# Patient Record
Sex: FEMALE
[De-identification: names, ages, dates, MRNs, and addresses within clinical notes are randomized per-mention and may not be internally consistent; named-entity substitution may affect disease eponyms.]

---

## 2019-01-01 ENCOUNTER — HOSPITAL ENCOUNTER (EMERGENCY)
Dept: HOSPITAL 14 - H.ER | Age: 34
Discharge: HOME | End: 2019-01-01
Payer: COMMERCIAL

## 2019-01-01 DIAGNOSIS — N12: Primary | ICD-10-CM

## 2019-01-01 LAB
BACTERIA #/AREA URNS HPF: (no result) /[HPF]
BASE EXCESS BLDV CALC-SCNC: 1.5 MMOL/L (ref 0–2)
BASOPHILS # BLD AUTO: 0.1 K/UL (ref 0–0.2)
BASOPHILS NFR BLD: 0.6 % (ref 0–2)
BILIRUB UR-MCNC: NEGATIVE MG/DL
BUN SERPL-MCNC: 17 MG/DL (ref 7–17)
CALCIUM SERPL-MCNC: 8.7 MG/DL (ref 8.4–10.2)
COLOR UR: YELLOW
EOSINOPHIL # BLD AUTO: 0.8 K/UL (ref 0–0.7)
EOSINOPHIL NFR BLD: 8 % (ref 0–4)
ERYTHROCYTE [DISTWIDTH] IN BLOOD BY AUTOMATED COUNT: 12.3 % (ref 11.5–14.5)
GFR NON-AFRICAN AMERICAN: > 60
GLUCOSE UR STRIP-MCNC: (no result) MG/DL
HGB BLD-MCNC: 13.9 G/DL (ref 12–16)
LEUKOCYTE ESTERASE UR-ACNC: (no result) LEU/UL
LYMPHOCYTES # BLD AUTO: 2.1 K/UL (ref 1–4.3)
LYMPHOCYTES NFR BLD AUTO: 20.7 % (ref 20–40)
MCH RBC QN AUTO: 31.3 PG (ref 27–31)
MCHC RBC AUTO-ENTMCNC: 33.4 G/DL (ref 33–37)
MCV RBC AUTO: 93.7 FL (ref 81–99)
MONOCYTES # BLD: 0.8 K/UL (ref 0–0.8)
MONOCYTES NFR BLD: 7.5 % (ref 0–10)
NEUTROPHILS # BLD: 6.6 K/UL (ref 1.8–7)
NEUTROPHILS NFR BLD AUTO: 63.2 % (ref 50–75)
NRBC BLD AUTO-RTO: 0.1 % (ref 0–0)
PCO2 BLDV: 43 MMHG (ref 40–60)
PH BLDV: 7.4 [PH] (ref 7.32–7.43)
PH UR STRIP: 7 [PH] (ref 5–8)
PLATELET # BLD: 264 K/UL (ref 130–400)
PMV BLD AUTO: 7.5 FL (ref 7.2–11.7)
PROT UR STRIP-MCNC: 100 MG/DL
RBC # BLD AUTO: 4.45 MIL/UL (ref 3.8–5.2)
RBC # UR STRIP: (no result) /UL
SP GR UR STRIP: 1.02 (ref 1–1.03)
SQUAMOUS EPITHIAL: 4 /HPF (ref 0–5)
URINE CLARITY: (no result)
UROBILINOGEN UR-MCNC: (no result) MG/DL (ref 0.2–1)
VENOUS BLOOD FIO2: 21 %
VENOUS BLOOD GAS PO2: 48 MM/HG (ref 30–55)
WBC # BLD AUTO: 10.4 K/UL (ref 4.8–10.8)

## 2019-01-01 PROCEDURE — 85025 COMPLETE CBC W/AUTO DIFF WBC: CPT

## 2019-01-01 PROCEDURE — 96374 THER/PROPH/DIAG INJ IV PUSH: CPT

## 2019-01-01 PROCEDURE — 74177 CT ABD & PELVIS W/CONTRAST: CPT

## 2019-01-01 PROCEDURE — 99284 EMERGENCY DEPT VISIT MOD MDM: CPT

## 2019-01-01 PROCEDURE — 81003 URINALYSIS AUTO W/O SCOPE: CPT

## 2019-01-01 PROCEDURE — 80048 BASIC METABOLIC PNL TOTAL CA: CPT

## 2019-01-01 PROCEDURE — 82803 BLOOD GASES ANY COMBINATION: CPT

## 2019-01-01 PROCEDURE — 81025 URINE PREGNANCY TEST: CPT

## 2019-01-01 NOTE — CT
Date of service: 



01/01/2019



PROCEDURE:  CT Abdomen and Pelvis .



HISTORY:

UTI, R flank pain



COMPARISON:

None.



TECHNIQUE:

Contiguous axial images of the abdomen and pelvis performed following 

intravenous injection of approximately 90 cc Omnipaque 300 contrast 

material.  Additional 2D sagittal and coronal reformats generated. 



Radiation dose:



Total exam DLP = 611.63 mGy-cm.



This CT exam was performed using one or more of the following dose 

reduction techniques: Automated exposure control, adjustment of the 

mA and/or kV according to patient size, and/or use of iterative 

reconstruction technique.



FINDINGS:



LOWER THORAX:

Heart size within range of normal.  No significant pericardial 

effusion.  There is a tiny hiatal hernia.  The 



LIVER:

Liver is enlarged measuring over 20 cm in CC dimension.  There 

appears to be some very mild fatty hepatic infiltration.  No obvious 

hepatic masses or collections seen on this noncontrast study.  Portal 

and splenic veins opacified 



GALLBLADDER AND BILE DUCTS:

Gallbladder is physiologically distended.  No evidence of 

intraluminal gallbladder calculi. 



PANCREAS:

Unremarkable. No mass. No ductal dilatation.



SPLEEN:

Unremarkable. No splenomegaly. 



ADRENALS:

No adrenal lesions 



KIDNEYS AND URETERS:

Kidneys demonstrate symmetric nephrograms.  No evidence of 

nephrolithiasis or.  There is slight dilatation of the right renal 

pelvis and collecting system possibly secondary to UTI given the 

patient's history of same. 



BLADDER:

Urinary bladder is incompletely which may in part account for slight 

thick-walled appearance however cystitis is also suspected given the 

patient's history of UTI 



REPRODUCTIVE:

Unremarkable. 



APPENDIX:

The appendix unremarkable.



BOWEL:

Evaluation of the bowel is limited due to the lack of oral contrast 

material. 



Stomach is distended with food debris liquid and air.  Visualized 

loops of small bowel exhibit normal contour and caliber.  No evidence 

of acute mechanical small bowel obstruction. 



No definitive mural wall thickening. 



PERITONEUM:

Unremarkable. No fluid collection. No free air.



LYMPH NODES:

Unremarkable. No enlarged lymph nodes. 



VASCULATURE:

Unremarkable. No aortic aneurysm. No aortic atherosclerotic 

calcification or mural plaque present.



BONES:

No fracture or destructive lesion. 



OTHER FINDINGS:

None. 



IMPRESSION:

There is a bladder wall thickening likely in part due to incomplete 

distention no cystitis with ascending right-sided UTI suspected as 

evidence by prominent right renal pelvis and right ureter. No 

evidence of nephrolithiasis



Mild hepatomegaly with fatty infiltration.

## 2019-01-01 NOTE — ED PDOC
HPI: Abdomen


Time Seen by Provider: 01/01/19 15:23


Chief Complaint (Nursing): Female Genitourinary


Chief Complaint (Provider): abdominal pain


History Per: Patient


History/Exam Limitations: no limitations


Onset/Duration Of Symptoms: Persistent (x1 week)


Current Symptoms Are (Timing): Still Present


Additional Complaint(s): 


33 year old female with pmHx of asthma, presents to ED with complaints of 

abdominal pain and right flank pain after recent completion of Bactrim use for 

UTI. Patient went to [facility] earlier today then advised to go to ED for a CT scan

to rule out kidney infection. No reports of fever, chills, nausea, or vomiting.





PCP: none provided





Past Medical History


Reviewed: Historical Data, Nursing Documentation, Vital Signs


Vital Signs: 





                                Last Vital Signs











Temp  98 F   01/01/19 15:15


 


Pulse  78   01/01/19 15:15


 


Resp  18   01/01/19 15:15


 


BP  132/85   01/01/19 15:15


 


Pulse Ox  99   01/01/19 15:15














- Medical History


PMH: Asthma





- Family History


Family History: States: Unknown Family Hx





- Home Medications


Home Medications: 


                                Ambulatory Orders











 Medication  Instructions  Recorded


 


Levofloxacin [Levaquin] 750 mg PO DAILY 10 Days #10 tablet 01/01/19














- Allergies


Allergies/Adverse Reactions: 


                                    Allergies











Allergy/AdvReac Type Severity Reaction Status Date / Time


 


FISH Allergy  SHORTNESS Verified 01/01/19 17:10





   OF BREATH  


 


tree nuts Allergy  SHORTNESS Uncoded 01/01/19 17:12





   OF BREATH  














Review of Systems


ROS Statement: Except As Marked, All Systems Reviewed And Found Negative


Constitutional: Negative for: Fever, Chills


Gastrointestinal: Positive for: Abdominal Pain.  Negative for: Nausea, Vomiting


Musculoskeletal: Positive for: Back Pain (right flank)





Physical Exam





- Reviewed


Nursing Documentation Reviewed: Yes


Vital Signs Reviewed: Yes





- Physical Exam


Appears: Positive for: No Acute Distress


Head Exam: Positive for: ATRAUMATIC, NORMAL INSPECTION, NORMOCEPHALIC


Skin: Positive for: Normal Color


Eye Exam: Positive for: Normal appearance


ENT: Positive for: Normal ENT Inspection


Neck: Positive for: Normal, Supple


Cardiovascular/Chest: Positive for: Regular Rate, Rhythm, Chest Non Tender


Respiratory: Positive for: Normal Breath Sounds.  Negative for: Respiratory 

Distress


Gastrointestinal/Abdominal: Positive for: Normal Exam, Soft.  Negative for: 

Tenderness


Back: Positive for: R CVA Tenderness.  Negative for: L CVA Tenderness


Extremity: Positive for: Normal ROM (upper/lower)


Neurologic/Psych: Positive for: Alert, Oriented.  Negative for: Motor/Sensory 

Deficits





- Laboratory Results


Result Diagrams: 


                                 01/01/19 15:43





                                 01/01/19 15:43





- ECG


O2 Sat by Pulse Oximetry: 99 (RA)


Pulse Ox Interpretation: Normal





Medical Decision Making


Medical Decision Making: 


Time: 1530


Initial Plan: well-appearing female with flank pain. Possible UTI vs. 

pyelonephritis vs. kidney stones vs. muscular spasm.


* Labs


* CT ABD/pelvis


* Toradol 30mg IVP





630PM


--Patient has signs of pyelonephritis on CT


--Will give IV Dose of levoquin


--Patient tolerating PO, normal viatls, well appearing, negative lactate, 

suitable for outpatient followup


--Return precautions discussed


 

--------------------------------------------------------------------------------


----------------------------------------


Scribe Attestation:


Documented by Jaylin Morales, acting as a scribe for Vipin Calderon MD.


Provider Scribe Attestation:


All medical record entries made by the Scribe were at my direction and 

personally dictated by me. I have reviewed the chart and agree that the record 

accurately reflects my personal performance of the history, physical exam, 

medical decision making, and the department course for this patient. I have also

 personally directed, reviewed, and agree with the discharge instructions and 

disposition.





Disposition





- Clinical Impression


Clinical Impression: 


 Pyelonephritis








- Disposition


Referrals: 


[facility] [place] [Outside]


Disposition Time: 18:41


Condition: IMPROVED


Prescriptions: 


Levofloxacin [Levaquin] 750 mg PO DAILY 10 Days #10 tablet


Instructions:  Kidney Infection (DC)


Forms:  [facility] (English)

## 2019-01-02 VITALS
HEART RATE: 74 BPM | OXYGEN SATURATION: 98 % | SYSTOLIC BLOOD PRESSURE: 128 MMHG | TEMPERATURE: 98.2 F | DIASTOLIC BLOOD PRESSURE: 66 MMHG | RESPIRATION RATE: 20 BRPM